# Patient Record
Sex: MALE | Race: BLACK OR AFRICAN AMERICAN | NOT HISPANIC OR LATINO | Employment: FULL TIME | URBAN - METROPOLITAN AREA
[De-identification: names, ages, dates, MRNs, and addresses within clinical notes are randomized per-mention and may not be internally consistent; named-entity substitution may affect disease eponyms.]

---

## 2019-10-08 PROBLEM — F15.21 AMPHETAMINE USE DISORDER, MODERATE, IN EARLY REMISSION: Status: ACTIVE | Noted: 2019-10-08

## 2019-10-08 PROBLEM — F12.20 CANNABIS USE DISORDER, MODERATE, DEPENDENCE: Status: ACTIVE | Noted: 2019-10-08

## 2019-10-08 PROBLEM — A15.9 TB (TUBERCULOSIS): Status: ACTIVE | Noted: 2018-02-03

## 2019-10-08 PROBLEM — F43.10 POSTTRAUMATIC STRESS DISORDER: Status: ACTIVE | Noted: 2019-10-08

## 2019-10-08 PROBLEM — F10.229: Status: ACTIVE | Noted: 2019-10-08

## 2019-10-08 PROBLEM — F20.0 CHRONIC PARANOID SCHIZOPHRENIA: Status: ACTIVE | Noted: 2019-10-08

## 2019-10-08 PROBLEM — M25.551 RIGHT HIP PAIN: Status: ACTIVE | Noted: 2017-07-29

## 2019-10-08 PROBLEM — R76.11 POSITIVE PPD: Status: ACTIVE | Noted: 2019-10-08

## 2019-10-08 PROBLEM — S36.509A COLON INJURY: Status: ACTIVE | Noted: 2019-10-08

## 2019-10-08 PROBLEM — K25.9 GASTRIC ULCER: Status: ACTIVE | Noted: 2018-11-01

## 2019-10-08 PROBLEM — S27.809A: Status: ACTIVE | Noted: 2019-10-08

## 2021-10-01 PROBLEM — F15.20 METHAMPHETAMINE USE DISORDER, MODERATE: Status: ACTIVE | Noted: 2019-10-08

## 2021-10-01 PROBLEM — Z59.41 FOOD INSECURITY: Status: ACTIVE | Noted: 2021-10-01

## 2022-03-31 PROBLEM — F17.200 TOBACCO USE DISORDER: Status: ACTIVE | Noted: 2022-03-31

## 2022-03-31 PROBLEM — F60.89 CLUSTER B PERSONALITY DISORDER: Status: ACTIVE | Noted: 2022-03-31

## 2022-03-31 PROBLEM — F60.89 CLUSTER B PERSONALITY DISORDER: Status: RESOLVED | Noted: 2022-03-31 | Resolved: 2022-03-31

## 2022-03-31 PROBLEM — F15.90 STIMULANT USE DISORDER: Status: ACTIVE | Noted: 2022-03-31

## 2022-03-31 PROBLEM — R74.8 ELEVATED CPK: Status: ACTIVE | Noted: 2022-03-31

## 2022-03-31 PROBLEM — N17.9 AKI (ACUTE KIDNEY INJURY): Status: ACTIVE | Noted: 2022-03-31

## 2022-03-31 PROBLEM — F12.10 CANNABIS USE DISORDER, MILD, ABUSE: Status: ACTIVE | Noted: 2022-03-31

## 2022-04-02 PROBLEM — K21.9 GERD (GASTROESOPHAGEAL REFLUX DISEASE): Chronic | Status: ACTIVE | Noted: 2022-04-02

## 2022-05-26 PROBLEM — F12.10 CANNABIS USE DISORDER, MILD, ABUSE: Status: RESOLVED | Noted: 2022-03-31 | Resolved: 2022-05-26

## 2022-05-26 PROBLEM — F10.20 ALCOHOL USE DISORDER, MODERATE, IN CONTROLLED ENVIRONMENT: Status: ACTIVE | Noted: 2019-10-08

## 2022-06-09 PROBLEM — F15.20 METHAMPHETAMINE USE DISORDER, MODERATE: Status: RESOLVED | Noted: 2019-10-08 | Resolved: 2022-06-09

## 2022-06-09 PROBLEM — F20.0 CHRONIC PARANOID SCHIZOPHRENIA: Status: RESOLVED | Noted: 2019-10-08 | Resolved: 2022-06-09

## 2022-06-22 PROBLEM — F51.5 NIGHTMARES: Status: ACTIVE | Noted: 2022-06-22

## 2022-07-20 PROBLEM — G47.00 INSOMNIA: Status: ACTIVE | Noted: 2022-07-20

## 2022-09-24 PROBLEM — F17.210 CIGARETTE NICOTINE DEPENDENCE WITHOUT COMPLICATION: Status: ACTIVE | Noted: 2022-03-31

## 2022-12-16 ENCOUNTER — SOCIAL WORK (OUTPATIENT)
Dept: ADMINISTRATIVE | Facility: OTHER | Age: 32
End: 2022-12-16

## 2022-12-16 NOTE — PROGRESS NOTES
SW placed a call to patient at [phone] to complete social work assessment and provide assistance for apply for social security disability. Pt did not answer but SW did leave a message with reason for call and requesting to return call to SW. SW awaiting return call. FÉLIX will re attempt to make contact w/ pt.     Jamaal Barker, [facility] [place]    [phone]

## 2023-05-05 ENCOUNTER — SOCIAL WORK (OUTPATIENT)
Dept: ADMINISTRATIVE | Facility: OTHER | Age: 33
End: 2023-05-05

## 2023-05-05 NOTE — PROGRESS NOTES
Sw received a consult to assist with counseling. Sw called Patient ([phone]). A voice message was left requesting he call back.    Cherri Roberts LCSW    [phone]

## 2023-05-09 ENCOUNTER — SOCIAL WORK (OUTPATIENT)
Dept: ADMINISTRATIVE | Facility: OTHER | Age: 33
End: 2023-05-09

## 2023-05-09 NOTE — PROGRESS NOTES
Sw received a consult to assist with counseling. Sw called and spoke to Patient ([phone]). He is agreeable to try counseling. Alexandr faxed a referral to [facility] to review.    Cherri Roberts LCSW    [phone]

## 2023-05-10 ENCOUNTER — SOCIAL WORK (OUTPATIENT)
Dept: ADMINISTRATIVE | Facility: OTHER | Age: 33
End: 2023-05-10

## 2023-05-10 NOTE — PROGRESS NOTES
Sw received a message from Kate at [facility] ([phone]). She spoke to Patient who declined their services at this time. He cited his work and another program he is involved in at this time as the reason for the decline. Patient will notify them when he is ready to start therapy.    Cherri Roberts, [facility]    [phone]